# Patient Record
Sex: FEMALE | Race: WHITE | NOT HISPANIC OR LATINO | Employment: FULL TIME | URBAN - METROPOLITAN AREA
[De-identification: names, ages, dates, MRNs, and addresses within clinical notes are randomized per-mention and may not be internally consistent; named-entity substitution may affect disease eponyms.]

---

## 2018-10-02 RX ORDER — PROCHLORPERAZINE MALEATE 10 MG
10 TABLET ORAL EVERY 6 HOURS PRN
COMMUNITY

## 2018-10-02 RX ORDER — LORATADINE 10 MG/1
10 TABLET ORAL DAILY
COMMUNITY

## 2018-10-02 RX ORDER — TRAMADOL HYDROCHLORIDE 50 MG/1
50 TABLET ORAL EVERY 6 HOURS PRN
COMMUNITY

## 2018-10-02 RX ORDER — PROPRANOLOL HCL 60 MG
60 CAPSULE, EXTENDED RELEASE 24HR ORAL DAILY
COMMUNITY

## 2018-10-02 RX ORDER — BUTALBITAL, ASPIRIN, AND CAFFEINE 50; 325; 40 MG/1; MG/1; MG/1
1 CAPSULE ORAL EVERY 4 HOURS PRN
COMMUNITY

## 2018-10-02 RX ORDER — TRAZODONE HYDROCHLORIDE 100 MG/1
100 TABLET ORAL
COMMUNITY

## 2018-10-02 RX ORDER — LORAZEPAM 0.5 MG/1
0.5 TABLET ORAL
COMMUNITY

## 2018-10-04 ENCOUNTER — ANESTHESIA EVENT (OUTPATIENT)
Dept: GASTROENTEROLOGY | Facility: HOSPITAL | Age: 48
End: 2018-10-04
Payer: COMMERCIAL

## 2018-10-05 ENCOUNTER — ANESTHESIA (OUTPATIENT)
Dept: GASTROENTEROLOGY | Facility: HOSPITAL | Age: 48
End: 2018-10-05
Payer: COMMERCIAL

## 2018-10-05 ENCOUNTER — HOSPITAL ENCOUNTER (OUTPATIENT)
Facility: HOSPITAL | Age: 48
Setting detail: OUTPATIENT SURGERY
Discharge: HOME | End: 2018-10-05
Attending: INTERNAL MEDICINE | Admitting: INTERNAL MEDICINE
Payer: COMMERCIAL

## 2018-10-05 VITALS
SYSTOLIC BLOOD PRESSURE: 129 MMHG | BODY MASS INDEX: 29.16 KG/M2 | HEART RATE: 76 BPM | OXYGEN SATURATION: 100 % | TEMPERATURE: 98.6 F | RESPIRATION RATE: 14 BRPM | HEIGHT: 65 IN | DIASTOLIC BLOOD PRESSURE: 81 MMHG | WEIGHT: 175 LBS

## 2018-10-05 RX ORDER — SODIUM CHLORIDE 9 MG/ML
125 INJECTION, SOLUTION INTRAVENOUS CONTINUOUS
Status: DISCONTINUED | OUTPATIENT
Start: 2018-10-05 | End: 2018-10-05 | Stop reason: HOSPADM

## 2018-10-05 RX ORDER — PROPOFOL 10 MG/ML
INJECTION, EMULSION INTRAVENOUS AS NEEDED
Status: DISCONTINUED | OUTPATIENT
Start: 2018-10-05 | End: 2018-10-05 | Stop reason: SURG

## 2018-10-05 RX ADMIN — PROPOFOL 20 MG: 10 INJECTION, EMULSION INTRAVENOUS at 08:40

## 2018-10-05 RX ADMIN — PROPOFOL 30 MG: 10 INJECTION, EMULSION INTRAVENOUS at 08:43

## 2018-10-05 RX ADMIN — PROPOFOL 100 MG: 10 INJECTION, EMULSION INTRAVENOUS at 08:37

## 2018-10-05 RX ADMIN — SODIUM CHLORIDE 125 ML/HR: 0.9 INJECTION, SOLUTION INTRAVENOUS at 08:08

## 2018-10-05 NOTE — OP NOTE
OPERATIVE REPORT  PATIENT NAME: Lashay Benavides    :  1970  MRN: [id number]  Pt Location: [facility] 01    SURGERY DATE: 10/5/2018  Colonoscopy Procedure Note    Procedure: Colonoscopy --diagnostic    Pre-operative Diagnosis: constipation and hematachezia; history of metastatic ovarian cancer    Post-operative Diagnosis: hemorrhoids and diverticulosis    Indications: rectal bleeding    Sedation:   Per anesthesia    Procedure Details     Prior to the procedure informed consent was obtained for the procedure, including risks associated with sedation, perforation, hemorrhage, adverse drug reaction, missed lesion, post procedure discomfort and aspiration were discussed  The patient was placed in the left lateral decubitus position  Based on the pre-procedure assessment, including review of the patient's medical history, medications, allergies, and review of systems, she had been deemed to be an appropriate candidate for conscious sedation; she was therefore sedated with the medications listed below  The patient was monitored continuously with ECG tracing, pulse oximetry, blood pressure monitoring, and direct observations  A rectal examination was performed  The variable-stiffness pediatric colonoscope was inserted into the rectum and advanced under direct vision to the terminal ileum  The quality of the colonic preparation was excellent  A careful inspection was made as the colonoscope was withdrawn, including a retroflexed view of the rectum; findings and interventions are described below  Appropriate photodocumentation was obtained  Findings:  -normal colonic mucosa throughout; Terminal ileum appears normal  A few scattered diverticuli are seen in the sigmoid region  Surgical anastomosis is identified at approximately 18 cm from the anal verge in appears healthy    Hemorrhoids within the anal canal  No polyps or suspicious lesions are seen    Specimens:  None           Complications:  None; patient tolerated the procedure well  Disposition: PACU            Condition: stable    Attending Attestation: I was present for the entire procedure    Impression:    -See post-procedure diagnoses  Recommendations:  -stool softeners to help with constipation      Surgeon(s) and Role:     * Leyda Spears MD - Primary    Preop Diagnosis:  Hemorrhage of anus and rectum [K62 5]  History of colonic polyps [Z86 010]    Post-Op Diagnosis Codes:     * Hemorrhage of anus and rectum [K62 5]     * History of colonic polyps [Z86 010]    Procedure(s) (LRB):  COLONOSCOPY (N/A)    Specimen(s):  * No specimens in log *    Estimated Blood Loss:   None    Anesthesia Type:   IV Sedation with Anesthesia    Operative Indications:  Hemorrhage of anus and rectum [K62 5]  History of colonic polyps [Z86 010]      SIGNATURE: Leyda Spears MD  DATE: October 5, 2018  TIME: 8:35 AM

## 2018-10-05 NOTE — ANESTHESIA PREPROCEDURE EVALUATION
Review of Systems/Medical History  Patient summary reviewed    History of anesthetic complications PONV    Cardiovascular  Negative cardio ROS Hypertension ,    Pulmonary  Negative pulmonary ROS        GI/Hepatic  Negative GI/hepatic ROS          Negative  ROS        Endo/Other  Negative endo/other ROS History of thyroid disease ,   Comment: Thyroid  nudules     GYN  Negative gynecology ROS          Hematology  Negative hematology ROS      Musculoskeletal  Negative musculoskeletal ROS        Neurology  Negative neurology ROS      Psychology   Negative psychology ROS Anxiety,              Physical Exam    Airway    Mallampati score: II  TM Distance: >3 FB  Neck ROM: full     Dental   No notable dental hx     Cardiovascular  Comment: Negative ROS, Rhythm: regular, Rate: normal, Cardiovascular exam normal    Pulmonary  Pulmonary exam normal Breath sounds clear to auscultation,     Other Findings        Anesthesia Plan  ASA Score- 2     Anesthesia Type- general with ASA Monitors  Additional Monitors:   Airway Plan:         Plan Factors-    Induction- intravenous  Postoperative Plan-     Informed Consent- Anesthetic plan and risks discussed with patient

## 2022-07-07 NOTE — DISCHARGE INSTRUCTIONS
Ambulatory Care Coordination Note  7/7/2022  CM Risk Score: 1  Charlson 10 Year Mortality Risk Score: 100%     ACC: Jj Morse, RENE    Summary Note:   ACM placed call to introduce patient to care coordination and explain role of ACM. Patients granddaughter Alejandra Jameson [facility]) is agreeable to working with ACM to better manage patients chronic illness. Alejandra Jameson completed enrollment into CC with ACM today. Patient has all her medication and takes them as prescribed except the Gabapentin. Alejandra Jameson has a concern with patient's order for Gabapentin. Patient original order is for Gabapentin 100mg 1 to 3 capsules by mouth 2 times daily as needed for (back pain). Alejandra Jameson said that the patient is taking 1 to 3 tablets in the morning and 3 to 4 tablets in the evening. Alejandra Jameson said she called patient's pharmacy and they told her it was too early to fill the Gabapentin. Alejandra Jameson said the last time it was filled was May 9th for 270 capsules. Alejandra Jameson said the patient only has enough to last her a day and a half. [facility] informed Alejandra Jameson what the prescription is for and said that I will send a message to the provider letting her know Wilton Quiñonez concerns. Alejandra Jameson also said that the palliative nurse was out to see the patient and was going to send a request for patient's prednisone to be increased from 5mg to 10mg daily. Patient is independent for personal hygiene needs, but is dependent on medication administration, food preparation, transportation, cleaning home, laundry, shopping, and managing finances. Alejandra Jameson said the palliative care nurse is also requesting a PT/OT home care evaluation to increase patient's mobility at home. [facility] informed Alejandra Jameson that I will send a message to provider requesting a home health PT/OT eval order be sent to [address] [place] [address] Rd voiced understanding. Alejandra Jameson denies having any other questions are concerns currently.  Alejandra Jameson has [facility] contact information and is encouraged to call with any [facility] Gastrointestinal Lab Discharge instructions    1  Rest today; avoid strenuous activity  It is common to have retained air in the intestinal tract following an endoscopy  Passing the air (flatus, belching) will assist in making abdominal bloating/cramping improve  2  No alcoholic beverages or driving for 12 hours if you were given sedation  3  Resume your usual diet and medications unless you were asked to change it prior to leaving the GI lab  Begin with small meal or snack first     4  Call our office at [phone] if you have any problems, concerns or questions  You may use this same number to schedule a follow up appointment if that has been suggested  Your Colonoscopy: was completed  Any abnormal findings are listed below  Findings included the following:    Surgical changes are seen in the sigmoid region which appear healthy  Diverticulosis is identified in the sigmoid region  Hemorrhoids are seen within the rectum  No polyps or suspicious areas or inflammatory changes are seen  Bleeding most likely is from hemorrhoidal origin  Continue with stool softeners and high-fiber diet to avoid constipation                    Colonoscopy   WHAT YOU NEED TO KNOW:   A colonoscopy is a procedure to examine the inside of your colon (intestine) with a scope  Polyps or tissue growths may have been removed during your colonoscopy  It is normal to feel bloated and to have some abdominal discomfort  You should be passing gas  If you have hemorrhoids or you had polyps removed, you may have a small amount of bleeding  DISCHARGE INSTRUCTIONS:   Seek care immediately if:   · You have a large amount of bright red blood in your bowel movements  · Your abdomen is hard and firm and you have severe pain  · You have sudden trouble breathing  Contact your healthcare provider if:   · You develop a rash or hives  · You have a fever within 24 hours of your procedure      · You have not had a bowel movement for 3 days after your procedure  · You have questions or concerns about your condition or care  Activity:   · Do not lift, strain, or run  for 3 days after your procedure  · Rest after your procedure  You have been given medicine to relax you  Do not  drive or make important decisions until the day after your procedure  Return to your normal activity as directed  · Relieve gas and discomfort from bloating  by lying on your right side with a heating pad on your abdomen  You may need to take short walks to help the gas move out  Eat small meals until bloating is relieved  If you had polyps removed: For 7 days after your procedure:  · Do not  take aspirin  · Do not  go on long car rides  Help prevent constipation:   · Eat a variety of healthy foods  Healthy foods include fruit, vegetables, whole-grain breads, low-fat dairy products, beans, lean meat, and fish  Ask if you need to be on a special diet  Your healthcare provider may recommend that you eat high-fiber foods such as cooked beans  Fiber helps you have regular bowel movements  · Drink liquids as directed  Adults should drink between 9 and 13 eight-ounce cups of liquid every day  Ask what amount is best for you  For most people, good liquids to drink are water, juice, and milk  · Exercise as directed  Talk to your healthcare provider about the best exercise plan for you  Exercise can help prevent constipation, decrease your blood pressure and improve your health  Follow up with your healthcare provider as directed:  Write down your questions so you remember to ask them during your visits  © 2017 [employer] Information is for End User's use only and may not be sold, redistributed or otherwise used for commercial purposes  All illustrations and images included in CareNotes® are the copyrighted property of [email or web address]  or Calvin Murphy    The above information is an educational questions or concerns. Plan  F/U on [facility]  Medications   Needs and concerns    [facility]  [phone]  [address]  [address] [place] [address] Primary Care      Ambulatory Care Coordination Assessment    Care Coordination Protocol  Referral from Primary Care Provider: No  Week 1 - Initial Assessment     Do you have all of your prescriptions and are they filled?: Yes  Barriers to medication adherence: Forgets to take, Other  Other barriers to medication adherence: Granddaughter takes care of medications  Are you able to afford your medications?: Yes  How often do you have trouble taking your medications the way you have been told to take them?: I always take them as prescribed. Do you have Home O2 Therapy?: No      Ability to seek help/take action for Emergent Urgent situations i.e. fire, crime, inclement weather or health crisis. : Independent  Ability to ambulate to restroom: Independent  Ability handle personal hygeine needs (bathing/dressing/grooming):  Independent  Ability to manage Medications: Dependent  Ability to prepare Food Preparation: Dependent  Ability to maintain home (clean home, laundry): Dependent  Ability to drive and/or has transportation: Dependent  Ability to do shopping: Dependent  Ability to manage finances: Dependent  Is patient able to live independently?: No     Current Housing: Private Residence        Per the Fall Risk Screening, did the patient have 2 or more falls or 1 fall with injury in the past year?: No     Frequent urination at night?: No  Do you use rails/bars?: No  Do you have a non-slip tub mat?: No     Are you experiencing loss of meaning?: Yes  Are you experiencing loss of hope and peace?: Yes     Thinking about your patient's physical health needs, are there any symptoms or problems (risk indicators) you are unsure about that require further investigation?: No aid only  It is not intended as medical advice for individual conditions or treatments  Talk to your doctor, nurse or pharmacist before following any medical regimen to see if it is safe and effective for you  Diverticulosis   WHAT YOU NEED TO KNOW:   What is diverticulosis? Diverticulosis is a condition that causes small pockets called diverticula to form in your intestine  These pockets make it difficult for bowel movements to pass through your digestive system  What causes diverticulosis? Diverticula form when muscles have to work hard to move bowel movements through the intestine  The force causes bulges to form at weak areas in the intestine  This may happen if you eat foods that are low in fiber  Fiber helps give your bowel movements more bulk so they are larger and easier to move through your colon  The following may increase your risk of diverticulosis:  · A history of constipation    · Age 36 or older    · Obesity    · Lack of exercise  What are the signs and symptoms of diverticulosis? Diverticulosis usually does not cause any signs or symptoms  It may cause any of the following in some people:  · Pain or discomfort in your lower abdomen    · Abdominal bloating    · Constipation or diarrhea  How is diverticulosis diagnosed? Your healthcare provider will examine you and ask about your bowel movements, diet, and symptoms  He or she will also ask about any medical conditions you have or medicines you take  You may need any of the following:  · Blood tests  may be done to check for signs of inflammation  · A barium enema  is an x-ray of your colon that may show diverticula  A tube is put into your anus, and a liquid called barium is put through the tube  Barium is used so that healthcare providers can see your colon more clearly  · Flexible sigmoidoscopy  is a test to look for any changes in your lower intestines and rectum  It may also show the cause of any bleeding or pain   A soft, identified areas of uncertainly or problems already being investigated   Are the patients physical health problems impacting on their mental well-being?: Mild impact on mental well-being e.g. \"\"feeling fed-up\"\", \"\"reduced enjoyment\"\"   Are there any problems with your patients lifestyle behaviors (alcohol, drugs, diet, exercise) that are impacting on physical or mental well-being?: Some mild concern of potential negative impact on well-being   Do you have any other concerns about your patients mental well-being? How would you rate their severity and impact on the patient?: Mild problems - don't interfere with function   How would you rate their home environment in terms of safety and stability (including domestic violence, insecure housing, neighbor harassment)?: Consistently safe, supportive, stable, no identified problems   How do daily activities impact on the patient's well-being? (include current or anticipated unemployment, work, caregiving, access to transportation or other): No identified problems or perceived positive benefits   How would you rate their social network (family, work, friends)?: Good participation with social networks   How would you rate their financial resources (including ability to afford all required medical care)?: Financially secure, resources adequate, no identified problems   How wells does the patient now understand their health and well-being (symptoms, signs or risk factors) and what they need to do to manage their health?: Poor understanding with significant impact on ability to manage health   How well do you think your patient can engage in healthcare discussions?  (Barriers include language, deafness, aphasia, alcohol or drug problems, learning difficulties, concentration): Some difficulties in communication with or without moderate barriers   Do other services need to be involved to help this patient?: Other care/services in place and adequate   Are current services bendable tube with a light on the end will be put into your anus  It will then be moved forward into your intestine  · A colonoscopy  is used to look at your whole colon  A scope (long bendable tube with a light on the end) is used to take pictures  This test may show diverticula  · A CT scan , or CAT scan, may show diverticula  You may be given contrast liquid before the scan  Tell the healthcare provider if you have ever had an allergic reaction to contrast liquid  How is diverticulosis managed? The goal of treatment is to manage any symptoms you have and prevent other problems such as diverticulitis  Diverticulitis is swelling or infection of the diverticula  Your healthcare provider may recommend any of the following:  · Eat a variety of high-fiber foods  High-fiber foods help you have regular bowel movements  High-fiber foods include cooked beans, fruits, vegetables, and some cereals  Most adults need 25 to 35 grams of fiber each day  Your healthcare provider may recommend that you have more  Ask your healthcare provider how much fiber you need  Increase fiber slowly  You may have abdominal discomfort, bloating, and gas if you add fiber to your diet too quickly  You may need to take a fiber supplement if you are not getting enough fiber from food  · Medicines  to soften your bowel movements may be given  You may also need medicines to treat symptoms such as bloating and pain  · Drink liquids as directed  You may need to drink 2 to 3 liters (8 to 12 cups) of liquids every day  Ask your healthcare provider how much liquid to drink each day and which liquids are best for you  · Apply heat  on your abdomen for 20 to 30 minutes every 2 hours for as many days as directed  Heat helps decrease pain and muscle spasms  How can I help prevent diverticulitis or other symptoms? The following may help decrease your risk for diverticulitis or symptoms, such as bleeding   Talk to your provider involved with this patient well-coordinated? (Include coordination with other services you are now recommendation): Required care/services in place and adequately coordinated   Suggested Interventions and Paco: In Process (Comment: Palliative care nurse asking for Home Health PT/OT eval for increased mobility at home)                  Prior to Admission medications    Medication Sig Start Date End Date Taking? Authorizing Provider   traMADol (ULTRAM) 50 MG tablet Take 50 mg by mouth every 8 hours as needed for Pain. Nelida Sanchez patient's granddaughter said patient hardly ever takes the Tramadol d/t causes hallucinations. Yes Historical Provider, MD   gabapentin (NEURONTIN) 100 MG capsule Take 1-3 capsules by mouth 2 times daily as needed (back pain) for up to 90 days. Intended supply: 90 days. Granddaughter said that patient is taking anywhere from 1 to 3 tablets in the morning and 3 to 4 tablets at night 7/6/22 10/4/22 Yes Nicolas Donald MD   pantoprazole (PROTONIX) 20 MG tablet TAKE 1 TABLET BY MOUTH EVERY DAY BEFORE BREAKFAST  Patient taking differently: Granddaughters said patient will take Protonix or Pepcid daily. Does not take both at the same time. 7/5/22  Yes Celeste Kevin MD   LORazepam (ATIVAN) 0.5 MG tablet Take 0.25 mg by mouth every 6 hours as needed. 6/10/22 7/10/22 Yes Historical Provider, MD   nitrofurantoin, macrocrystal-monohydrate, (MACROBID) 100 MG capsule Take 100 mg by mouth 2 times daily as needed For UTI 6/10/22  Yes Historical Provider, MD   furosemide (LASIX) 20 MG tablet Take 1 tablet by mouth 2 times daily  Patient taking differently: Take 20 mg by mouth as needed 1 to 2 times as day as needed 7/1/22  Yes Celeste Kevin MD   famotidine (PEPCID) 20 MG tablet Take 1 tablet by mouth 2 times daily  Patient taking differently: Take 20 mg by mouth daily as needed Granddaughters said patient will take Protonix or Pepcid daily.  Does not take both about these or other things you can do to prevent problems that may occur with diverticulosis  · Exercise regularly  Ask your healthcare provider about the best exercise plan for you  Exercise can help you have regular bowel movements  Get 30 minutes of exercise on most days of the week  · Maintain a healthy weight  Ask your healthcare provider how much you should weigh  Ask him or her to help you create a weight loss plan if you are overweight  · Do not smoke  Nicotine and other chemicals in cigarettes increase your risk for diverticulitis  Ask your healthcare provider for information if you currently smoke and need help to quit  E-cigarettes or smokeless tobacco still contain nicotine  Talk to your healthcare provider before you use these products  · Ask your healthcare provider if it is safe to take NSAIDs  NSAIDs may increase your risk of diverticulitis  When should I seek immediate care? · You have severe pain on the left side of your lower abdomen  · Your bowel movements are bright or dark red  When should I contact my healthcare provider? · You have a fever and chills  · You feel dizzy or lightheaded  · You have nausea, or you are vomiting  · You have a change in your bowel movements  · You have questions or concerns about your condition or care  CARE AGREEMENT:   You have the right to help plan your care  Learn about your health condition and how it may be treated  Discuss treatment options with your caregivers to decide what care you want to receive  You always have the right to refuse treatment  The above information is an  only  It is not intended as medical advice for individual conditions or treatments  Talk to your doctor, nurse or pharmacist before following any medical regimen to see if it is safe and effective for you    © 2017 [id number] Information is for End User's use only and may not be sold, redistributed or otherwise used at the same time. 6/20/22  Yes Claire Barnard MD   XARELTO 15 MG TABS tablet TAKE 1 TABLET BY MOUTH EVERY DAY 5/16/22  Yes Claire Barnard MD   lovastatin (MEVACOR) 40 MG tablet TAKE 1 TABLET BY MOUTH EVERYDAY AT BEDTIME  Patient taking differently: every morning  5/2/22  Yes Claire Barnard MD   Docusate Sodium (DOCULASE PO) Take by mouth   Yes Historical Provider, MD   dilTIAZem HCl ER Coated Beads (CARDIZEM LA) 240 MG TB24 TAKE 1 TABLET BY MOUTH EVERY DAY 7/9/21  Yes Ian Plummer MD   Blood Pressure KIT 1 each by Does not apply route once a week 9/15/20  Yes Claire Barnard MD   predniSONE (DELTASONE) 10 MG tablet Take 1 tablet by mouth daily 7/6/22 8/5/22  Prema Spann MD   SODIUM CHLORIDE, EXTERNAL, 0.9 % SOLN Apply 10 mLs topically daily  Patient not taking: Reported on 3/31/2022 2/4/21   Dereck Felder,    polyethylene glycol (GLYCOLAX) 17 g packet Take 17 g by mouth daily as needed for Constipation  Patient not taking: Reported on 7/6/2022    Historical Provider, MD       No future appointments.    and   General Assessment    Do you have any symptoms that are causing concern?: No for commercial purposes  All illustrations and images included in CareNotes® are the copyrighted property of A D A M , Inc  or Calvin Murphy  Hemorrhoids   WHAT YOU NEED TO KNOW:   What are hemorrhoids? Hemorrhoids are swollen blood vessels inside your rectum (internal hemorrhoids) or on your anus (external hemorrhoids)  Sometimes a hemorrhoid may prolapse  This means it extends out of your anus  What increases my risk for hemorrhoids? · Pregnancy or obesity    · Straining or sitting for a long time during bowel movements    · Liver disease    · Weak muscles around the anus caused by older age, rectal surgery, or anal intercourse    · A lack of physical activity    · Chronic diarrhea or constipation    · A low-fiber diet  What are the signs and symptoms of hemorrhoids? · Pain or itching around your anus or inside your rectum    · Swelling or bumps around your anus    · Bright red blood in your bowel movement, on the toilet paper, or in the toilet bowl    · Tissue bulging out of your anus (prolapsed hemorrhoids)    · Incontinence (poor control over urine or bowel movements)  How are hemorrhoids diagnosed? Your healthcare provider will ask about your symptoms, the foods you eat, and your bowel movements  He will examine your anus for external hemorrhoids  You may need the following:  · A digital rectal exam  is a test to check for hemorrhoids  Your healthcare provider will put a gloved finger inside your anus to feel for the hemorrhoids  · An anoscopy  is a test that uses a scope (small tube with a light and camera on the end) to look at your hemorrhoids  How are hemorrhoids treated? Treatment will depend on your symptoms  You may need any of the following:  · Medicines  can help decrease pain and swelling, and soften your bowel movement  The medicine may be a pill, pad, cream, or ointment  · Procedures  may be used to shrink or remove your hemorrhoid   Examples include rubber-band ligation, sclerotherapy, and photocoagulation  These procedures may be done in your healthcare provider's office  Ask your healthcare provider for more information about these procedures  · Surgery  may be needed to shrink or remove your hemorrhoids  How can I manage my symptoms? · Apply ice on your anus for 15 to 20 minutes every hour or as directed  Use an ice pack, or put crushed ice in a plastic bag  Cover it with a towel before you apply it to your anus  Ice helps prevent tissue damage and decreases swelling and pain  · Take a sitz bath  Fill a bathtub with 4 to 6 inches of warm water  You may also use a sitz bath pan that fits inside a toilet bowl  Sit in the sitz bath for 15 minutes  Do this 3 times a day, and after each bowel movement  The warm water can help decrease pain and swelling  · Keep your anal area clean  Gently wash the area with warm water daily  Soap may irritate the area  After a bowel movement, wipe with moist towelettes or wet toilet paper  Dry toilet paper can irritate the area  How can I help prevent hemorrhoids? · Do not strain to have a bowel movement  Do not sit on the toilet too long  These actions can increase pressure on the tissues in your rectum and anus  · Drink plenty of liquids  Liquids can help prevent constipation  Ask how much liquid to drink each day and which liquids are best for you  · Eat a variety of high-fiber foods  Examples include fruits, vegetables, and whole grains  Ask your healthcare provider how much fiber you need each day  You may need to take a fiber supplement  · Exercise as directed  Exercise, such as walking, may make it easier to have a bowel movement  Ask your healthcare provider to help you create an exercise plan  · Do not have anal sex  Anal sex can weaken the skin around your rectum and anus  · Avoid heavy lifting  This can cause straining and increase your risk for another hemorrhoid    When should I seek immediate care? · You have severe pain in your rectum or around your anus  · You have severe pain in your abdomen and you are vomiting  · You have bleeding from your anus that soaks through your underwear  When should I contact my healthcare provider? · You have frequent and painful bowel movements  · Your hemorrhoid looks or feels more swollen than usual      · You do not have a bowel movement for 2 days or more  · You see or feel tissue coming through your anus  · You have questions or concerns about your condition or care  CARE AGREEMENT:   You have the right to help plan your care  Learn about your health condition and how it may be treated  Discuss treatment options with your caregivers to decide what care you want to receive  You always have the right to refuse treatment  The above information is an  only  It is not intended as medical advice for individual conditions or treatments  Talk to your doctor, nurse or pharmacist before following any medical regimen to see if it is safe and effective for you  © 2017 [employer] Information is for End User's use only and may not be sold, redistributed or otherwise used for commercial purposes  All illustrations and images included in CareNotes® are the copyrighted property of [employer]  or Calvin Murphy